# Patient Record
Sex: MALE | Race: WHITE | NOT HISPANIC OR LATINO | Employment: FULL TIME | URBAN - METROPOLITAN AREA
[De-identification: names, ages, dates, MRNs, and addresses within clinical notes are randomized per-mention and may not be internally consistent; named-entity substitution may affect disease eponyms.]

---

## 2024-05-27 ENCOUNTER — HOSPITAL ENCOUNTER (EMERGENCY)
Facility: HOSPITAL | Age: 24
Discharge: HOME | End: 2024-05-27
Attending: STUDENT IN AN ORGANIZED HEALTH CARE EDUCATION/TRAINING PROGRAM
Payer: MEDICAID

## 2024-05-27 VITALS
RESPIRATION RATE: 20 BRPM | BODY MASS INDEX: 22.38 KG/M2 | WEIGHT: 180 LBS | TEMPERATURE: 98 F | OXYGEN SATURATION: 99 % | DIASTOLIC BLOOD PRESSURE: 70 MMHG | HEIGHT: 75 IN | HEART RATE: 53 BPM | SYSTOLIC BLOOD PRESSURE: 129 MMHG

## 2024-05-27 DIAGNOSIS — H57.11 ACUTE RIGHT EYE PAIN: ICD-10-CM

## 2024-05-27 DIAGNOSIS — S05.01XA ABRASION OF RIGHT CORNEA, INITIAL ENCOUNTER: Primary | ICD-10-CM

## 2024-05-27 PROCEDURE — 99284 EMERGENCY DEPT VISIT MOD MDM: CPT | Mod: ER

## 2024-05-27 RX ORDER — OFLOXACIN 3 MG/ML
1 SOLUTION/ DROPS OPHTHALMIC 4 TIMES DAILY
Qty: 10 ML | Refills: 0 | Status: SHIPPED | OUTPATIENT
Start: 2024-05-27 | End: 2024-06-10

## 2024-05-27 RX ORDER — ERYTHROMYCIN 5 MG/G
OINTMENT OPHTHALMIC
Qty: 3.5 G | Refills: 0 | Status: SHIPPED | OUTPATIENT
Start: 2024-05-27

## 2024-05-27 NOTE — DISCHARGE INSTRUCTIONS

## 2024-05-27 NOTE — Clinical Note
"Randell "Alexander Conley was seen and treated in our emergency department on 5/27/2024.  He may return to work on 05/28/2024.  Limited work for Mr. Conley due to eye injury.      If you have any questions or concerns, please don't hesitate to call.      Dick Wong MD"

## 2024-05-27 NOTE — ED PROVIDER NOTES
Encounter Date: 5/27/2024       History     Chief Complaint   Patient presents with    Foreign Body in Eye     Patient presents w/ a c/o of metal in his right eye s/t wielding yesterday approximately 1500. Reports flash burn as well. Pt wore protective eye wear during the incident. No relief with OTC eye gtt/wash.      23 year old male with right eye pain after getting metal in his eye at work yesterday. He was wearing eye protection but the wind he says got something in his eye. Reports worsening pain this morning with tearing. Left eye is without discomfort. No trauma otherwise. No hx of intravitreal instrumentation.       Review of patient's allergies indicates:  No Known Allergies  No past medical history on file.  No past surgical history on file.  No family history on file.     Review of Systems    Physical Exam     Initial Vitals [05/27/24 0912]   BP Pulse Resp Temp SpO2   129/70 (!) 53 20 97.8 °F (36.6 °C) 99 %      MAP       --         Physical Exam    Nursing note and vitals reviewed.  Constitutional: He appears well-developed and well-nourished.   HENT:   Head: Normocephalic and atraumatic.   Eyes: EOM are normal. Pupils are equal, round, and reactive to light.    Small metallic foreign body identified to the right eye, superficially.  Unable to remove with a Q-tip.  Explained that this could be removed with a 18 gauge needle however would create a corneal abrasion.  Patient aware, we discussed the risks and benefits of the procedure.  I offered to refer him to Ophthalmology and given antibiotics however he requested that we go ahead and do the procedure at bedside.   Neck: Neck supple. No JVD present.   Normal range of motion.  Cardiovascular:  Normal rate and regular rhythm.           Pulmonary/Chest: Breath sounds normal. No stridor. No respiratory distress.   Abdominal: Abdomen is soft. There is no abdominal tenderness.   Musculoskeletal:         General: No tenderness or edema. Normal range of  motion.      Cervical back: Normal range of motion and neck supple.     Neurological: He is alert and oriented to person, place, and time. GCS score is 15. GCS eye subscore is 4. GCS verbal subscore is 5. GCS motor subscore is 6.   Skin: Skin is warm. Capillary refill takes less than 2 seconds.   Psychiatric: He has a normal mood and affect. Thought content normal.         ED Course   Procedures  Labs Reviewed - No data to display       Imaging Results    None          Medications - No data to display  Medical Decision Making  Risk  Prescription drug management.               ED Course as of 05/27/24 0948   Mon May 27, 2024   0946   Metallic foreign object removed superficially from the right cornea with blunt 18 gauge needle. [BG]      ED Course User Index  [BG] Dick Wong MD                           Clinical Impression:  Final diagnoses:  [S05.01XA] Abrasion of right cornea, initial encounter (Primary)  [H57.11] Acute right eye pain          ED Disposition Condition    Discharge Stable          ED Prescriptions       Medication Sig Dispense Start Date End Date Auth. Provider    ofloxacin (OCUFLOX) 0.3 % ophthalmic solution Place 1 drop into the right eye 4 (four) times daily. for 14 days 10 mL 5/27/2024 6/10/2024 Dick Wong MD    erythromycin (ROMYCIN) ophthalmic ointment Place a 1/2 inch ribbon of ointment into the lower eyelid. 3.5 g 5/27/2024 -- Dick Wong MD          Follow-up Information       Follow up With Specialties Details Why Contact Info    [facility] Go to  As needed [id number] [id number] [phone]             Dick Wong MD  05/27/24 1565